# Patient Record
Sex: FEMALE | Race: WHITE | Employment: UNEMPLOYED | URBAN - NONMETROPOLITAN AREA
[De-identification: names, ages, dates, MRNs, and addresses within clinical notes are randomized per-mention and may not be internally consistent; named-entity substitution may affect disease eponyms.]

---

## 2017-09-21 ENCOUNTER — HOSPITAL ENCOUNTER (OUTPATIENT)
Age: 62
Discharge: HOME | End: 2017-09-21
Payer: MEDICARE

## 2017-09-21 PROCEDURE — 93005 ELECTROCARDIOGRAM TRACING: CPT | Performed by: PSYCHIATRY & NEUROLOGY

## 2017-09-22 LAB
EKG ATRIAL RATE: 66 BPM
EKG P AXIS: 56 DEGREES
EKG P-R INTERVAL: 180 MS
EKG Q-T INTERVAL: 424 MS
EKG QRS DURATION: 76 MS
EKG QTC CALCULATION (BAZETT): 444 MS
EKG R AXIS: 7 DEGREES
EKG T AXIS: 40 DEGREES
EKG VENTRICULAR RATE: 66 BPM

## 2021-03-24 ENCOUNTER — OFFICE VISIT (OUTPATIENT)
Dept: BARIATRICS/WEIGHT MGMT | Age: 66
End: 2021-03-24
Payer: MEDICARE

## 2021-03-24 VITALS
WEIGHT: 194 LBS | BODY MASS INDEX: 33.12 KG/M2 | RESPIRATION RATE: 18 BRPM | HEART RATE: 88 BPM | TEMPERATURE: 97 F | SYSTOLIC BLOOD PRESSURE: 140 MMHG | DIASTOLIC BLOOD PRESSURE: 77 MMHG | HEIGHT: 64 IN

## 2021-03-24 DIAGNOSIS — E78.5 HYPERLIPIDEMIA, UNSPECIFIED HYPERLIPIDEMIA TYPE: ICD-10-CM

## 2021-03-24 DIAGNOSIS — M19.90 ARTHRITIS: ICD-10-CM

## 2021-03-24 DIAGNOSIS — F31.9 BIPOLAR AFFECTIVE DISORDER, REMISSION STATUS UNSPECIFIED (HCC): ICD-10-CM

## 2021-03-24 DIAGNOSIS — I10 ESSENTIAL HYPERTENSION: ICD-10-CM

## 2021-03-24 DIAGNOSIS — F32.A DEPRESSION, UNSPECIFIED DEPRESSION TYPE: ICD-10-CM

## 2021-03-24 DIAGNOSIS — F41.9 ANXIETY: ICD-10-CM

## 2021-03-24 DIAGNOSIS — F17.200 SMOKER: ICD-10-CM

## 2021-03-24 PROBLEM — K21.9 GASTROESOPHAGEAL REFLUX DISEASE: Status: ACTIVE | Noted: 2021-03-24

## 2021-03-24 PROBLEM — J42 CHRONIC BRONCHITIS (HCC): Status: ACTIVE | Noted: 2021-03-24

## 2021-03-24 PROBLEM — Z87.39 HISTORY OF MUSCULOSKELETAL DISORDER: Status: ACTIVE | Noted: 2021-03-24

## 2021-03-24 PROBLEM — F43.20 ANACLITIC DEPRESSION: Status: ACTIVE | Noted: 2021-03-24

## 2021-03-24 PROBLEM — J44.9 CHRONIC OBSTRUCTIVE PULMONARY DISEASE (HCC): Status: ACTIVE | Noted: 2021-03-24

## 2021-03-24 PROBLEM — J45.909 ASTHMA: Status: ACTIVE | Noted: 2021-03-24

## 2021-03-24 PROCEDURE — 99203 OFFICE O/P NEW LOW 30 MIN: CPT | Performed by: PHYSICIAN ASSISTANT

## 2021-03-24 RX ORDER — NADOLOL 40 MG/1
40 TABLET ORAL DAILY
COMMUNITY

## 2021-03-24 NOTE — PROGRESS NOTES
readiness to change 10. We also reviewed barriers to change and her personal statement. Motivators include: to feel better about myself, to improve my health, to increase my energy and to allow me to live a more active and social lifestyle      STOP BANG Questionnaire:4    Sleep study 2019-Mild SHAYLEE- unable to tolerate dental device  Neck Circumference:  16   Inches          Weight Related Comorbid Conditions:   Significant weight related diseases affecting this patient are Hypertension, Hyperlipidemia, Obstructive Sleep Apnea, Depression and Anxiety    Allergies:  No Known Allergies    Past Medical History:     Past Medical History:   Diagnosis Date    Anxiety     Arthritis     Asthma     Bipolar 1 disorder ([address]     Depression     GERD (gastroesophageal reflux disease)     Hiatal hernia     Hypercholesteremia    .     Past Surgical History:  Past Surgical History:   Procedure Laterality Date    APPENDECTOMY      BACK SURGERY      GASTRIC FUNDOPLICATION      HYSTERECTOMY      TONSILLECTOMY         Family History:  Family History   Problem Relation Age of Onset    Cancer Mother     Stroke Mother     Arthritis Mother     Obesity Mother     Cancer Father     Cancer Brother     Cancer Sister     Heart Disease Sister        Social History:  Social History     Socioeconomic History    Marital status:      Spouse name: Not on file    Number of children: Not on file    Years of education: Not on file    Highest education level: Not on file   Occupational History    Not on file   Social Needs    Financial resource strain: Not on file    Food insecurity     Worry: Not on file     Inability: Not on file    Transportation needs     Medical: Not on file     Non-medical: Not on file   Tobacco Use    Smoking status: Current Every Day Smoker    Smokeless tobacco: Never Used   Substance and Sexual Activity    Alcohol use: No    Drug use: No    Sexual activity: Not on file   Lifestyle    Physical activity     Days per week: Not on file     Minutes per session: Not on file    Stress: Not on file   Relationships    Social connections     Talks on phone: Not on file     Gets together: Not on file     Attends Latter day service: Not on file     Active member of club or organization: Not on file     Attends meetings of clubs or organizations: Not on file     Relationship status: Not on file    Intimate partner violence     Fear of current or ex partner: Not on file     Emotionally abused: Not on file     Physically abused: Not on file     Forced sexual activity: Not on file   Other Topics Concern    Not on file   Social History Narrative    Not on file       Current Medications:  Outpatient Medications Marked as Taking for the 3/24/21 encounter (Office Visit) with JEREMIAH Leonard   Medication Sig Dispense Refill    nadolol (CORGARD) 40 MG tablet Take 40 mg by mouth daily      Chondroitin Sulfate A 150 MG CAPS Take 1 tablet by mouth daily      lactase (LACTAID FAST ACT) 9000 units TABS Take 9,000 Units by mouth once      Acetaminophen (TYLENOL EXTRA STRENGTH PO) Take 650 mg by mouth 2 times daily      lamoTRIgine (LAMICTAL) 200 MG tablet Take 100 mg by mouth 2 times daily       QUEtiapine (SEROQUEL) 400 MG tablet 500 mg nightly       simvastatin (ZOCOR) 40 MG tablet Take 40 mg by mouth nightly.  meloxicam (MOBIC) 15 MG tablet Take 15 mg by mouth daily.  escitalopram (LEXAPRO) 20 MG tablet Take 20 mg by mouth daily.  clonazePAM (KLONOPIN) 0.5 MG tablet Take 0.5 mg by mouth 2 times daily as needed for Anxiety.  Glucos-Chond-Sterol-Fish Oil (GLUCOSAMINE CHONDROITIN PLUS) CAPS Take  by mouth. 24 hour diet recall and physical activity reviewed. Review of Systems:   Constitutional: Denies any fever, chills, (+) fatigue. Wound: Denies any rash, skin color changes or wound problems. Resp: Denies any cough, (+)shortness of breath.   CV: Denies any chest pain, orthopnea or syncope. Endocrine: Denies heat intolerance, cold intolerance,polydipsia, polyuria  MS: Denies any myalgias, (+)arthralgias  GI: Denies any nausea, vomiting, diarrhea, constipation. (+) reflux  : Denies any hematuria, hesitancy or dysuria. Neuro: Denies dizziness, syncope, headaches. Objective:    BP (!) 140/77 (Site: Right Upper Arm, Position: Sitting, Cuff Size: Large Adult)   Pulse 88   Temp 97 °F (36.1 °C) (Infrared)   Resp 18   Ht 5' 4\" (1.626 m)   Wt 194 lb (88 kg)   BMI 33.30 kg/m²   Body mass index is 33.3 kg/m². Physical Exam:  CONSTITUTIONAL: alert, cooperative, no apparent distress. Alert and oriented x 3. SKIN:  No visible rashes or lesions. HEENT: Head is normocephalic, atraumatic. EOMI b/l  NECK: Supple  CHEST/LUNGS: respirations unlabored. CARDIOVASCULAR:Regular rate and rhythm. NEUROLOGIC: There are no focalizing motor or sensory deficits. CN II-XII are grossly intact. Clovia Carls EXTREMITIES: no cyanosis, no clubbing and no edema. Assessment:       Diagnosis Orders   1. BMI 33.0-33.9,adult     2. Depression, unspecified depression type     3. Anxiety     4. Bipolar affective disorder, remission status unspecified ([facility]     5. Essential hypertension     6. Hyperlipidemia, unspecified hyperlipidemia type     7. Smoker     8. Arthritis         Plan:    Management of obesity through monitored weight loss. She is not interested in surgical options for weight loss at this time. She  Would like to proceed with supervised weight loss through our Weight Management program.     Aquiles Swain will be referred to our dietitian for evaluation and treatment. At that visit, goals and nutrition plan will be instituted, including vitamin supplementation. Additionally, this patient will begin a monitored exercise program with evaluation by our exercise physiologist, as discussed. She was given a copy of our nutrition classes and support group schedule and is encouraged to attend. Discussed FDA approved weight loss medications and will consider utilizing after maximum weight loss with diet and exercise. Discussed the [facility] program and will consider starting at next appointment. I spent a total of 30 minutes with Halina Macedo today with greater than 50% of that time spent in face to face counseling and coordination of care regarding the issues covered in this note. Return in about 1 month (around 4/24/2021) for Follow up. I appreciate the opportunity to be involved in this patient's care.     Electronically signed by JEREMIAH Carlson on 3/24/2021 at 3:31 PM

## 2021-04-27 NOTE — PROGRESS NOTES
Gale Nelson is a 77 y.o. female with a date of birth of 1955. Patient is a 77 y.o. female seen for initial MNT visit for medically supervised weight loss. Vitals from current and previous visits:  Vitals 3/85/1695   SYSTOLIC 080   DIASTOLIC 77   Site Right Upper Arm   Position Sitting   Cuff Size Large Adult   Pulse 88   Temp 97   Resp 18   SpO2    Weight 194 lb   Height 5' 4\"   Body mass index 33.3 kg/m2   Pain Level    Waist (Inches) 47   Neck circumference 16      Vitals 3/29/6371   SYSTOLIC    DIASTOLIC    Site    Position    Cuff Size    Pulse    Temp 97.3   Resp    SpO2    Weight 194 lb   Height 5' 4\"   Body mass index 33.3 kg/m2   Pain Level    Waist (Inches)    Neck circumference       BMI: = 33  Obesity Classification: Class I    Weight History: Wt Readings from Last 3 Encounters:   03/24/21 194 lb (88 kg)   09/21/16 168 lb (76.2 kg)   08/19/16 169 lb (76.7 kg)     (+)Smoker- has a nicorette inhaler to start using and trying to quit smoking  \"I just want a healthier diet\"  Pt has a psychiatrist that prescribes/adjusts medications every three months or sooner if needed at  [address] States seeing psychologist at same place every three-four weeks for many years  Describe your current weight: states arthritis is increasing. States weight causing strain on lower back. Patient's lowest adult weight was 135 lb at age 40's. The lowest weight was achieved through physical demanding job and stress. Patient was at her lowest weight for 5 years. Noted weight 2016 ~ 168 lbs. Reports was put on Seroquel ~ 3-4 years ago and has had significant t weight gain but reports needs to stay on this medication to manage symptoms  Patient's highest adult weight was 194 lb at age 77. Patient was at her highest weight for 2 years. Patient has participated in the following weight loss programs:  Keto- most recent attempt six months ago (states made pt sick), [facility].      Patient has participated in meal replacement/liquid diets. Patient has not participated in weight loss medications. Patient is  lactose intolerant- takes lactaid pills for better digestion. Patient does not have Gnosticist/cultural food concerns. Patient does not have food allergies. Patient dines out to a sit down restaurant 1 time per week or less. Patient has fast food or picks up carry out  2-3 times per week. Grocery Shopping in household done by: self  Cooking in household done by: self  Pt and spouse in house. Spouse gone all week as  and pt often just coking for self  \"I don't eat good\" \"I don't have a set time I go to bed\"  Can go to bed as early as 7am and as late as 11pm- up by 8:30a-9:30am  24 hour recall/food frequency chart:  Breakfast: yes. 2:49M-5:93UP- slice of wheat bread with pancake syrup and peanut butter  Snack: no.   Lunch: yes. 11a-12pm- today had bread, peanut butter and syrup because states \"Running out of food in house\". - denies this is due to finances but mainly because hasn't been to the grocery store. No lunch meat. May have leftovers  Snack: no.   Dinner: yes. Last nite tried crab legs but burnt them, can of beans or breaded fish filets. When orders fast food gets chinese or taco bell. Sometimes uses a instant pot- ribs, roast.  Likes vegetables  Snack: yes. Gets into ice cream daily. Doesn't like chips. Does like chocolate candy \"nibbles on it\"    Drinks throughout the day: one cup of coffee with powdered creamer, \"I try to get some water into me\"- uses 8 oz glass five times a day, diet pepsi or diet mt dew bottles one a day, \"I'm craving grape juice\"- ~ 32 oz per day when drinking it- mid afternoon thru evening    Do you drink alcohol? No.     Patient does not meet the criteria for binge eating disorder. Patient does not have grazing. Patient does not have night eating. Patient does not have a history of emotional eating or eating out of boredom.   Patient describes self as fast eater. Pt voices having \"cravings\" for certain foods (eg- juice or meats) then that goes away and has craving for a different food- has noticed this since age 19. Patient describes level of activity as sedentary- does like to garden. Patient plans to attend Fitness Orientation on: May 13th at 4:00pm    Assessment  Nutritional Needs: [place] = 1406 kcal x 1.2 (activity factor)= 1687 kcal - 500 calories (for 1-2 lb weight loss/week)= ~ 1931-9249 kcals per day  Food recall reveals tendency to eat same foods, snack on chocolate candies with little water consumption. Pt open to begin working on drinking more water and seeking to increase variety in meal planning to include all food groups  Nutrition Diagnosis: Overweight/Obesity related to Lack of previous MNT as evidenced by BMI of 33. Plan  Plan/Recommendations:  Reviewed with patient food label reading to identify serving sizes and carbohydrates. Education provided on ~1500 calorie individualized meal plan with 3 meals and 1 snacks daily. Suggested 45 % calories from carbohydrates, 25 % calories from protein and 30 % calories from fat to assist with a balanced meal plan and encourage variety. Handouts given:   1. Individual Meal Plan  2. Food Journal  3. Cooking for One  4. Sample menus  5. My Plate Meal Planning Picture Guide    Goals  Weight: Long term goal is ~ 145 lbs  Long term. Pt willing to start with goal of ~ 5-10% weight loss over next three months  Health Improvement: Patient to work towards a long term healthy eating pattern to assist with weight loss. -  Patient Instructions   Goals:  1. Nutrition Goal:  I will use my food journal to record meal times, serving sizes and bring back to next dietitian visit  2. Water Goal:  I will work on increasing my overall water intake to at least 8 cups (= 64 oz per day)  3. Exercise Goal:  I will use [facility] at least twice a week.   Start taking short walk 10 minutes three days a week  4. Attend Fitness Orientation on Thursday May 13th at 4:00pm in [facility]        -Followup visit: 3 weeks with dietitian.          Kehinde Diaz RD, LD  Dietitian- Weight Management [address] [place] [address]

## 2021-04-30 ENCOUNTER — OFFICE VISIT (OUTPATIENT)
Dept: BARIATRICS/WEIGHT MGMT | Age: 66
End: 2021-04-30

## 2021-04-30 VITALS — HEIGHT: 64 IN | TEMPERATURE: 97.3 F | BODY MASS INDEX: 33.12 KG/M2 | WEIGHT: 194 LBS

## 2021-04-30 NOTE — PATIENT INSTRUCTIONS
Goals: 1. Nutrition Goal:  I will use my food journal to record meal times, serving sizes and bring back to next dietitian visit  2. Water Goal:  I will work on increasing my overall water intake to at least 8 cups (= 64 oz per day)  3. Exercise Goal:  I will use [facility] at least twice a week. Start taking short walk 10 minutes three days a week  4.  Attend Fitness Orientation on Thursday May 13th at 4:00pm in [facility]

## 2021-05-17 NOTE — PROGRESS NOTES
Assessment: Patient is a 77 y.o. female seen for   month one  follow up MNT visit for  medically supervised weight loss    Vitals from current and previous visits:  Vitals 5/14/7506   SYSTOLIC    DIASTOLIC    Site    Position    Cuff Size    Pulse    Temp 97.3   Resp    SpO2    Weight 194 lb   Height 5' 4\"   Body mass index 33.3 kg/m2   Pain Level    Waist (Inches)    Neck circumference        Initial weight at start of Weight Management Program was: 194 lbs     Carley Lozano lost 6 lbs over two weeks  -Weight goal: lose weight. - Is patient taking daily Multivitamin:  Does not take a MVI  (+)Smoker- states having difficulty quit smoking- gave pt referral to [facility] smoking cessation program today  Pt and spouse in house. Spouse gone all week as  and pt often just coking for self   Can go to bed as early as 7pm and as late as 11pm- up by 8:30a-9:30am  -Food Recall:   Pt had food journal in office for review  Breakfast: ~8am- one waffle, 1tbsp peanut butter, 1/2 cup blueberries or 1/2 cup oatmeal, 1/2 banana, 6 oz light yogurt. Lunch: taco bell- two beef hard shell supreme's or 3 oz pork chop, 1/2 cup mashed potatoes, 1/2 cup blueberries. Or turkey lunch meat, lettuce salad, 1/2 cup brown rice, brussel sprouts   Dinner: chobani complete, and one orange or chicken breast, brussels  sprouts, salad, wild rice and peaches. Snacks: two cups popcorn. Ice cream only once over last two weeks, switched to no sugar added fudge pops daily times one. Stopped candy    Main Beverages: one cup of coffee with fat free powdered creamer, today reports water intake is about ~ 32 oz water/day- was not getting as much as originally thought. , Drinking diet pepsi or diet mt dew once a day,  \"Denies drinking grape juice \"I can't have that\"- however last visit stated drank ~ 32oz juice a day    Denies \"cravings\" today.     -Impression of Dietary Intake: watching portion sizes closer, working on not skipping meals. - Pt  is working towards avoiding high fat/high sugar foods. Pt  is working towards  including protein at meals and snacks. Exercise:  Patient has not attended Fitness Orientation yet- missed May 13th Orientation  -Physical Activity is:  Up to 30 minutes walking now at least three days a week . Is interested in increasing frequency of walking and desires to try and get to next Fitness Orientation on May 27th         Nutrition Diagnosis: Overweight/Obesity related to currently undergoing MNT as evidenced by  Body mass index is 32.27 kg/m². .    Intervention:   Healthy behaviors: consistently logging food intake, increase physical activity and including lean proteins with meals  Patient with active effort towards weight loss efforts from initial RD visit. Positive reinforcement given. Goals reviewed with patient. No additional questions at this time. Patient Instructions   Goals:  1. Exercise Goal: I will walk outside at least 30 minutes 4-5 times a week  2. Attend Fitness Orientation on Thursday May 27th at 4:00pm in [facility]  3. I will continue to use my food journal to record meal times, serving sizes and bring back to next dietitian visit  4. Water goal- keep increasing your water intake to final goal of at least 64 oz per day       -Followup visit: 6 weeks with dietitian. PA visit in 4 weeks.         Felipe Brody, RD, LD, RD, LD  Dietitian- Weight Management [address] [place] [address]

## 2021-05-19 ENCOUNTER — OFFICE VISIT (OUTPATIENT)
Dept: BARIATRICS/WEIGHT MGMT | Age: 66
End: 2021-05-19

## 2021-05-19 VITALS — TEMPERATURE: 97.5 F | BODY MASS INDEX: 32.1 KG/M2 | HEIGHT: 64 IN | WEIGHT: 188 LBS

## 2021-05-19 NOTE — PATIENT INSTRUCTIONS
Goals: 1. Exercise Goal: I will walk outside at least 30 minutes 4-5 times a week  2. Attend Fitness Orientation on Thursday May 27th at 4:00pm in [facility]  3. I will continue to use my food journal to record meal times, serving sizes and bring back to next dietitian visit  4.   Water goal- keep increasing your water intake to final goal of at least 64 oz per day

## 2021-05-21 ENCOUNTER — TELEPHONE (OUTPATIENT)
Dept: BARIATRICS/WEIGHT MGMT | Age: 66
End: 2021-05-21

## 2021-05-21 NOTE — TELEPHONE ENCOUNTER
Called patient to inform Fitness Orientation moved to Tuesday May 25th instead of Thursday May 27th at 4:00pm.     Pt willing to attend Tuesday May 25th at 4:00pm in [facility] office. No further questions at this time.

## 2021-06-21 ENCOUNTER — OFFICE VISIT (OUTPATIENT)
Dept: BARIATRICS/WEIGHT MGMT | Age: 66
End: 2021-06-21
Payer: MEDICARE

## 2021-06-21 VITALS
SYSTOLIC BLOOD PRESSURE: 134 MMHG | BODY MASS INDEX: 30.54 KG/M2 | TEMPERATURE: 98.1 F | DIASTOLIC BLOOD PRESSURE: 72 MMHG | HEART RATE: 60 BPM | RESPIRATION RATE: 18 BRPM | WEIGHT: 178.9 LBS | HEIGHT: 64 IN

## 2021-06-21 DIAGNOSIS — F41.9 ANXIETY: ICD-10-CM

## 2021-06-21 DIAGNOSIS — F32.A DEPRESSION, UNSPECIFIED DEPRESSION TYPE: ICD-10-CM

## 2021-06-21 DIAGNOSIS — E78.5 HYPERLIPIDEMIA, UNSPECIFIED HYPERLIPIDEMIA TYPE: ICD-10-CM

## 2021-06-21 DIAGNOSIS — I10 ESSENTIAL HYPERTENSION: ICD-10-CM

## 2021-06-21 DIAGNOSIS — F17.200 SMOKER: ICD-10-CM

## 2021-06-21 DIAGNOSIS — F31.9 BIPOLAR AFFECTIVE DISORDER, REMISSION STATUS UNSPECIFIED (HCC): ICD-10-CM

## 2021-06-21 DIAGNOSIS — M19.90 ARTHRITIS: ICD-10-CM

## 2021-06-21 PROCEDURE — 4040F PNEUMOC VAC/ADMIN/RCVD: CPT | Performed by: PHYSICIAN ASSISTANT

## 2021-06-21 PROCEDURE — G8427 DOCREV CUR MEDS BY ELIG CLIN: HCPCS | Performed by: PHYSICIAN ASSISTANT

## 2021-06-21 PROCEDURE — 1090F PRES/ABSN URINE INCON ASSESS: CPT | Performed by: PHYSICIAN ASSISTANT

## 2021-06-21 PROCEDURE — 4004F PT TOBACCO SCREEN RCVD TLK: CPT | Performed by: PHYSICIAN ASSISTANT

## 2021-06-21 PROCEDURE — G8400 PT W/DXA NO RESULTS DOC: HCPCS | Performed by: PHYSICIAN ASSISTANT

## 2021-06-21 PROCEDURE — 1123F ACP DISCUSS/DSCN MKR DOCD: CPT | Performed by: PHYSICIAN ASSISTANT

## 2021-06-21 PROCEDURE — 99213 OFFICE O/P EST LOW 20 MIN: CPT | Performed by: PHYSICIAN ASSISTANT

## 2021-06-21 PROCEDURE — 3017F COLORECTAL CA SCREEN DOC REV: CPT | Performed by: PHYSICIAN ASSISTANT

## 2021-06-21 PROCEDURE — G8417 CALC BMI ABV UP PARAM F/U: HCPCS | Performed by: PHYSICIAN ASSISTANT

## 2021-06-21 RX ORDER — PREDNISONE 10 MG/1
TABLET ORAL
COMMUNITY
Start: 2021-06-17

## 2021-06-21 NOTE — PROGRESS NOTES
[facility]  [address]  [place], [address] [facility]  [phone]      Visit Date:  6/21/2021  Weight Management Pre-Op Follow-up    HPI:    Medically Supervised follow-up- Month #1 of 3- medical supervision    Sanchez Thomas is here today for continued supervised weight management of morbid obesity. Weight today 178#. Down 16# since last appointment about 3 months ago. BMI 30. She reports that she is working on the behavior changes discussed at her initial appointment. Has met with the dietitian and has been making recommended changes. Reports that she had a 60# weight gain after starting Seroquel. Notes that she has been able to lose weight since decreasing dose of Seroquel from 500mg to 400mg. Has also been making recommended changes to nutrition. Has started tracking food intake which has helped hold her accountable. Eating 3 meals daily. Cooking most meals at home. More mindful of food choices, and doing better. Has been trying decrease sugar intake. Eating out <1 time per week. Has been trying to drink more water. Occasional pop and coffee. Long discussion on importance of lifestyle changes, making better decisions with nutrition and increasing dedicated activity to be successful lifelong with weight loss with or without bariatric surgery. Comorbid conditions include anxiety, depression, bipolar, hypertension, hyperlipidemia, arthritis, sleep apnea ( unable to tolerate dental device). Seca scale completed and reviewed. Physical Activity:  Gardening/ walking/ mowing      Current BMI: Body mass index is 30.71 kg/m².   Current Weight:   Wt Readings from Last 3 Encounters:   06/21/21 178 lb 14.4 oz (81.1 kg)   05/19/21 188 lb (85.3 kg)   04/30/21 194 lb (88 kg)     Initial Body Weight:194    Past Medical History:  Past Medical History:   Diagnosis Date    Anxiety     Arthritis     Asthma     Bipolar 1 disorder (HCC)     Depression     GERD (gastroesophageal reflux disease)     Hiatal hernia     Hypercholesteremia        Past Surgical History:  Past Surgical History:   Procedure Laterality Date    APPENDECTOMY      BACK SURGERY      GASTRIC FUNDOPLICATION      HYSTERECTOMY      TONSILLECTOMY         Past Social History:  Social History     Socioeconomic History    Marital status:      Spouse name: Not on file    Number of children: Not on file    Years of education: Not on file    Highest education level: Not on file   Occupational History    Not on file   Tobacco Use    Smoking status: Current Every Day Smoker    Smokeless tobacco: Never Used   Vaping Use    Vaping Use: Never used   Substance and Sexual Activity    Alcohol use: No    Drug use: No    Sexual activity: Not on file   Other Topics Concern    Not on file   Social History Narrative    Not on file     Social Determinants of Health     Financial Resource Strain:     Difficulty of Paying Living Expenses:    Food Insecurity:     Worried About Running Out of Food in the Last Year:     Ran Out of Food in the Last Year:    Transportation Needs:     Lack of Transportation (Medical):      Lack of Transportation (Non-Medical):    Physical Activity:     Days of Exercise per Week:     Minutes of Exercise per Session:    Stress:     Feeling of Stress :    Social Connections:     Frequency of Communication with Friends and Family:     Frequency of Social Gatherings with Friends and Family:     Attends Anabaptism Services:     Active Member of Clubs or Organizations:     Attends Club or Organization Meetings:     Marital Status:    Intimate Partner Violence:     Fear of Current or Ex-Partner:     Emotionally Abused:     Physically Abused:     Sexually Abused:         Medications:   Current Outpatient Medications   Medication Sig Dispense Refill    predniSONE (DELTASONE) 10 MG tablet       nadolol (CORGARD) 40 MG tablet Take 40 mg by mouth daily      lactase (LACTAID FAST ACT) 9000 units TABS Take 9,000 Units by mouth once      Acetaminophen (TYLENOL EXTRA STRENGTH PO) Take 650 mg by mouth 2 times daily      lamoTRIgine (LAMICTAL) 200 MG tablet Take 100 mg by mouth 2 times daily       QUEtiapine (SEROQUEL) 400 MG tablet 500 mg nightly       simvastatin (ZOCOR) 40 MG tablet Take 40 mg by mouth nightly.  escitalopram (LEXAPRO) 20 MG tablet Take 20 mg by mouth daily.  clonazePAM (KLONOPIN) 0.5 MG tablet Take 0.5 mg by mouth 2 times daily as needed for Anxiety.  Chondroitin Sulfate A 150 MG CAPS Take 1 tablet by mouth daily (Patient not taking: Reported on 6/21/2021)      meloxicam (MOBIC) 15 MG tablet Take 15 mg by mouth daily. (Patient not taking: Reported on 6/21/2021)      Glucos-Chond-Sterol-Fish Oil (GLUCOSAMINE CHONDROITIN PLUS) CAPS Take  by mouth. (Patient not taking: Reported on 6/21/2021)       No current facility-administered medications for this visit. Allergies:   No Known Allergies    Subjective:    Review of Systems:  Constitutional: Denies any fever, chills, fatigue. Wound: Denies any rash, skin color changes or wound problems. Resp: Denies any cough, shortness of breath. CV: Denies any chest pain, orthopnea or syncope. MS: Denies myalgias, (+)arthralgias. GI: Denies any nausea, vomiting, diarrhea, constipation, melena, hematochezia. : Denies any hematuria, hesitancy or dysuria. NEURO: Denies seizures, (+)headache. Objective:    /72 (Site: Right Upper Arm, Position: Sitting, Cuff Size: Large Adult)   Pulse 60   Temp 98.1 °F (36.7 °C) (Infrared)   Resp 18   Ht 5' 4\" (1.626 m)   Wt 178 lb 14.4 oz (81.1 kg)   BMI 30.71 kg/m²   Constitutional: Alert and oriented to person, place and time. Well-developed, well- nourished. Head: Normocephalic and atraumatic  Neck: Supple. Eyes: EOMI b/l. Conjunctivae normal.  No scleral icterus. Respiratory: Effort normal. No respiratory distress.   Abdomen: Obese  Ext: · Psychology evaluation with Dr. Knenedi [email or web address]  · Continue following with personal counselor and Dr. Jacobo Landin as scheduled. · Physical Therapy referral  · EGD prior to any surgical intervention  · Encouraged to attend support groups. · Goal to lose 1-2# per week  · Seca scale completed and reviewed. Return in about 1 month (around 7/21/2021) for Follow up. I spent over 20 minutes with the patient, with greater that 50% of that time spent on education, counseling and coordination of care.      Electronically signed by JEREMIAH Lewis on 6/21/2021 at 3:12 PM

## 2021-06-21 NOTE — PATIENT INSTRUCTIONS
· Behavior modification discussed in detail in regards to dietary habits. · Nutritional education occurred during visit. Continue following recommendations  per dietitian. · Improvement in fitness/exercise discussed with patient and the need for this  with/without surgery. · Schedule orientation with Manpreet Rivera, Exercise Physiologist, at the fitness  center. · Psychology evaluation with Dr. Eric Bergman prn  · Continue following with personal counselor and Dr. Pastor Alvarez as scheduled. · Physical Therapy referral  · EGD prior to any surgical intervention  · Encouraged to attend support groups. · Goal to lose 1-2# per week  · Seca scale completed and reviewed.

## 2023-10-05 ENCOUNTER — OFFICE VISIT (OUTPATIENT)
Dept: BARIATRICS/WEIGHT MGMT | Age: 68
End: 2023-10-05
Payer: MEDICARE

## 2023-10-05 VITALS
TEMPERATURE: 97.5 F | BODY MASS INDEX: 31.79 KG/M2 | HEART RATE: 80 BPM | DIASTOLIC BLOOD PRESSURE: 62 MMHG | HEIGHT: 65 IN | WEIGHT: 190.8 LBS | SYSTOLIC BLOOD PRESSURE: 104 MMHG

## 2023-10-05 DIAGNOSIS — E78.5 HYPERLIPIDEMIA, UNSPECIFIED HYPERLIPIDEMIA TYPE: ICD-10-CM

## 2023-10-05 DIAGNOSIS — F32.A DEPRESSION, UNSPECIFIED DEPRESSION TYPE: ICD-10-CM

## 2023-10-05 DIAGNOSIS — F41.9 ANXIETY: ICD-10-CM

## 2023-10-05 DIAGNOSIS — I10 ESSENTIAL HYPERTENSION: ICD-10-CM

## 2023-10-05 DIAGNOSIS — G47.33 OSA (OBSTRUCTIVE SLEEP APNEA): ICD-10-CM

## 2023-10-05 PROCEDURE — 3074F SYST BP LT 130 MM HG: CPT | Performed by: PHYSICIAN ASSISTANT

## 2023-10-05 PROCEDURE — G8427 DOCREV CUR MEDS BY ELIG CLIN: HCPCS | Performed by: PHYSICIAN ASSISTANT

## 2023-10-05 PROCEDURE — 3017F COLORECTAL CA SCREEN DOC REV: CPT | Performed by: PHYSICIAN ASSISTANT

## 2023-10-05 PROCEDURE — 1090F PRES/ABSN URINE INCON ASSESS: CPT | Performed by: PHYSICIAN ASSISTANT

## 2023-10-05 PROCEDURE — 1123F ACP DISCUSS/DSCN MKR DOCD: CPT | Performed by: PHYSICIAN ASSISTANT

## 2023-10-05 PROCEDURE — G8417 CALC BMI ABV UP PARAM F/U: HCPCS | Performed by: PHYSICIAN ASSISTANT

## 2023-10-05 PROCEDURE — 99214 OFFICE O/P EST MOD 30 MIN: CPT | Performed by: PHYSICIAN ASSISTANT

## 2023-10-05 PROCEDURE — G8484 FLU IMMUNIZE NO ADMIN: HCPCS | Performed by: PHYSICIAN ASSISTANT

## 2023-10-05 PROCEDURE — 4004F PT TOBACCO SCREEN RCVD TLK: CPT | Performed by: PHYSICIAN ASSISTANT

## 2023-10-05 PROCEDURE — G8400 PT W/DXA NO RESULTS DOC: HCPCS | Performed by: PHYSICIAN ASSISTANT

## 2023-10-05 PROCEDURE — 3078F DIAST BP <80 MM HG: CPT | Performed by: PHYSICIAN ASSISTANT

## 2023-10-05 RX ORDER — QUETIAPINE FUMARATE 100 MG/1
TABLET, FILM COATED ORAL
COMMUNITY
Start: 2023-08-26

## 2023-10-23 ENCOUNTER — OFFICE VISIT (OUTPATIENT)
Dept: BARIATRICS/WEIGHT MGMT | Age: 68
End: 2023-10-23

## 2023-10-23 VITALS — HEIGHT: 65 IN | WEIGHT: 191.4 LBS | BODY MASS INDEX: 31.89 KG/M2

## 2023-10-23 NOTE — PATIENT INSTRUCTIONS
Goals:  Nutrition Goal:  Aim for regular meal times: suggest ~ 7:30am- breakfast  11-12pm- lunch  4-5pm- dinner  Exercise Goal:  Start Chair Exercises 20-30 minutes every day in morning.   Can start water aerobics here at [address] (see handout given to you)  Water Goal:  Make sure drinking at least 64 oz of water every day  Try no more than 2-3 Tablespoons of creamer in coffee (use actual measuring spoon to reduce creamer)  Try grabbing handful frozen fruit in evening in place of ice cream

## 2025-03-04 ENCOUNTER — APPOINTMENT (OUTPATIENT)
Dept: GENERAL RADIOLOGY | Age: 70
End: 2025-03-04
Attending: EMERGENCY MEDICINE
Payer: MEDICARE

## 2025-03-04 ENCOUNTER — HOSPITAL ENCOUNTER (EMERGENCY)
Age: 70
Discharge: HOME | End: 2025-03-04
Attending: EMERGENCY MEDICINE
Payer: MEDICARE

## 2025-03-04 VITALS
SYSTOLIC BLOOD PRESSURE: 148 MMHG | WEIGHT: 196 LBS | BODY MASS INDEX: 33.46 KG/M2 | TEMPERATURE: 97 F | HEART RATE: 75 BPM | DIASTOLIC BLOOD PRESSURE: 70 MMHG | HEIGHT: 64 IN | OXYGEN SATURATION: 98 % | RESPIRATION RATE: 16 BRPM

## 2025-03-04 DIAGNOSIS — J06.9 UPPER RESPIRATORY TRACT INFECTION, UNSPECIFIED TYPE: Primary | ICD-10-CM

## 2025-03-04 DIAGNOSIS — J44.1 COPD EXACERBATION (HCC): ICD-10-CM

## 2025-03-04 LAB
INFLUENZA A BY PCR: NOT DETECTED
INFLUENZA B BY PCR: NOT DETECTED
SARS-COV-2 RNA, RT PCR: NOT DETECTED

## 2025-03-04 PROCEDURE — 87636 SARSCOV2 & INF A&B AMP PRB: CPT

## 2025-03-04 PROCEDURE — 71046 X-RAY EXAM CHEST 2 VIEWS: CPT

## 2025-03-04 PROCEDURE — 99284 EMERGENCY DEPT VISIT MOD MDM: CPT

## 2025-03-04 RX ORDER — ALBUTEROL SULFATE 90 UG/1
2 INHALANT RESPIRATORY (INHALATION) 4 TIMES DAILY PRN
Qty: 18 G | Refills: 0 | Status: SHIPPED | OUTPATIENT
Start: 2025-03-04

## 2025-03-04 RX ORDER — PREDNISONE 10 MG/1
TABLET ORAL
Qty: 30 TABLET | Refills: 0 | Status: SHIPPED | OUTPATIENT
Start: 2025-03-04

## 2025-03-04 RX ORDER — BENZONATATE 100 MG/1
100 CAPSULE ORAL 2 TIMES DAILY PRN
Qty: 14 CAPSULE | Refills: 0 | Status: SHIPPED | OUTPATIENT
Start: 2025-03-04 | End: 2025-03-11

## 2025-03-04 ASSESSMENT — PAIN - FUNCTIONAL ASSESSMENT
PAIN_FUNCTIONAL_ASSESSMENT: NONE - DENIES PAIN
PAIN_FUNCTIONAL_ASSESSMENT: NONE - DENIES PAIN

## 2025-03-04 NOTE — DISCHARGE INSTRUCTIONS
Use over-the-counter cough or cold medication like Robitussin or Mucinex.  You may also use Tessalon if no help.  Take prednisone daily.  This will decrease inflammation in your lungs.  Albuterol for any other wheezing or other problems.  Your follow-up with primary care

## 2025-03-04 NOTE — ED TRIAGE NOTES
Pt with cough, wheezing, and says her chest hurts when she coughs. Denies fever. Takes an inhaler in powder form.

## 2025-03-04 NOTE — ED PROVIDER NOTES
[facility] Emergency Department  [address]  [facility] [address]  Phone: [phone]  EMERGENCY DEPARTMENT ENCOUNTER      Pt Name: Mendy Winn  MRN: [id number]  Birthdate 1955  Date of evaluation: 3/4/2025  Provider: Panchito Chandler MD    CHIEF COMPLAINT       Chief Complaint   Patient presents with    Cough     Cough, wheezing, denies SOB. \"The cough hurts my chest.\"         HISTORY OF PRESENT ILLNESS      Mendy Winn is a 69 y.o. female who presents to the emergency department with above-noted complaint.  Patient URI cough congestion been going on for couple months.  Felt more symptoms today.  No other associate symptoms at this time such as headache neck pain chest pain or abdominal pain.  She still is persistent cough        REVIEW OF SYSTEMS     Positive for URI cough.  No bowel or bladder habit changes.  No weakness  Review of Systems  All systems negative except as marked.     PAST MEDICAL HISTORY     Past Medical History:   Diagnosis Date    Anxiety     Arthritis     Asthma     Bipolar 1 disorder (HCC)     Depression     GERD (gastroesophageal reflux disease)     Hiatal hernia     Hypercholesteremia     Pneumonia     dec. 24         SURGICAL HISTORY       Past Surgical History:   Procedure Laterality Date    APPENDECTOMY      BACK SURGERY      GASTRIC FUNDOPLICATION      HERNIA REPAIR      HIATAL HERNIA REPAIR      HYSTERECTOMY (CERVIX STATUS UNKNOWN)      JOINT REPLACEMENT      TONSILLECTOMY           CURRENT MEDICATIONS       Discharge Medication List as of 3/4/2025  1:55 PM        CONTINUE these medications which have NOT CHANGED    Details   lamoTRIgine (LAMICTAL) 200 MG tablet Take 0.5 tablets by mouth 2 times dailyHistorical Med      !! QUEtiapine (SEROQUEL) 400 MG tablet Take 1 tablet by mouth nightlyHistorical Med      simvastatin (ZOCOR) 40 MG tablet Take 1 tablet by mouth nightlyHistorical Med      escitalopram (LEXAPRO) 20 MG tablet Take 1 tablet by mouth